# Patient Record
Sex: MALE | Race: WHITE | NOT HISPANIC OR LATINO | Employment: UNEMPLOYED | URBAN - METROPOLITAN AREA
[De-identification: names, ages, dates, MRNs, and addresses within clinical notes are randomized per-mention and may not be internally consistent; named-entity substitution may affect disease eponyms.]

---

## 2023-10-09 PROBLEM — S82.872A: Status: ACTIVE | Noted: 2023-10-09

## 2023-10-09 PROBLEM — M25.572 ACUTE LEFT ANKLE PAIN: Status: ACTIVE | Noted: 2023-10-09

## 2023-10-09 PROBLEM — F17.210 CIGARETTE SMOKER: Status: ACTIVE | Noted: 2023-10-09

## 2023-10-18 PROBLEM — Z98.890 STATUS POST SURGERY: Status: ACTIVE | Noted: 2023-10-18

## 2024-01-18 PROBLEM — I73.9 PERIPHERAL VASCULAR DISEASE: Status: ACTIVE | Noted: 2024-01-18

## 2024-01-18 PROBLEM — T84.9XXA: Status: ACTIVE | Noted: 2024-01-18

## 2024-01-18 PROBLEM — Z91.199 NON-COMPLIANT PATIENT: Status: ACTIVE | Noted: 2024-01-18

## 2024-04-11 ENCOUNTER — TELEPHONE (OUTPATIENT)
Dept: SMOKING CESSATION | Facility: CLINIC | Age: 59
End: 2024-04-11

## 2024-04-11 NOTE — TELEPHONE ENCOUNTER
Called patient to confirm clinic intake for smoking cessation on Monday 4/15/24. Patient answered and confirmed.

## 2024-04-15 ENCOUNTER — CLINICAL SUPPORT (OUTPATIENT)
Dept: SMOKING CESSATION | Facility: CLINIC | Age: 59
End: 2024-04-15
Payer: COMMERCIAL

## 2024-04-15 DIAGNOSIS — F17.200 NICOTINE DEPENDENCE: Primary | ICD-10-CM

## 2024-04-15 RX ORDER — IBUPROFEN 200 MG
1 TABLET ORAL DAILY
Qty: 28 PATCH | Refills: 0 | Status: SHIPPED | OUTPATIENT
Start: 2024-04-15 | End: 2024-05-01

## 2024-04-15 NOTE — Clinical Note
Met with patient in clinic to conduct Quit Attempt 1 intake appointment. Patient will be participating in weekly tobacco cessation meetings and will begin the prescribed tobacco cessation medication 21 mg patches. FTND of 7 indicates a low level of dependence to tobacco. ADRY-D of 0 is perceived as no mental distress/ depression.

## 2024-05-01 ENCOUNTER — CLINICAL SUPPORT (OUTPATIENT)
Dept: SMOKING CESSATION | Facility: CLINIC | Age: 59
End: 2024-05-01
Payer: COMMERCIAL

## 2024-05-01 ENCOUNTER — TELEPHONE (OUTPATIENT)
Dept: SMOKING CESSATION | Facility: CLINIC | Age: 59
End: 2024-05-01

## 2024-05-01 DIAGNOSIS — F17.200 NICOTINE DEPENDENCE: Primary | ICD-10-CM

## 2024-05-01 PROCEDURE — 99402 PREV MED CNSL INDIV APPRX 30: CPT | Mod: S$GLB,,, | Performed by: GENERAL PRACTICE

## 2024-05-01 NOTE — Clinical Note
Spoke with patient at length via phone regarding tobacco cessation progress. Patient remains on prescribed tobacco cessation medication regimen of 21 mg patches but he did not use them. Patient says that he smoked 20 cigs a day when he came to his intake appointment 2 weeks ago but but he has not smoked since our visit on 4/17/24.. Patient wanted to quit smoking because of his health and he know he needs to. Patient says he loves to read. Trigger is beer. Discussed learned addiction model, cues/triggers, personal reasons for quitting, medications, goals. The patient will continue with  therapy sessions and medication monitoring by CTTS. Prescribed medication management will be by physician. The patient denies any abnormal behavioral or mental changes at this time.

## 2024-05-01 NOTE — PROGRESS NOTES
Individual Follow-Up Form    5/1/2024    Quit Date: 4/18/24    Clinical Status of Patient: Outpatient    Length of Service: 30 minutes    Continuing Medication: no    Other Medications: none     Target Symptoms: Withdrawal and medication side effects. The following were rated moderate (3) to severe (4) on TCRS:  Moderate (3): none  Severe (4): none    Comments: Spoke with patient at length via phone regarding tobacco cessation progress. Patient received the prescribed tobacco cessation medication regimen of 21 mg patches but he did not use them. He says that he could quit on his own. Patient says that he smoked 20 cigs a day when he came to his intake appointment 2 weeks ago but but he has not smoked since our visit on 4/17/24.. Patient wanted to quit smoking because of his health and he know he needs to. Patient says he loves to read. Trigger is beer. Discussed learned addiction model, cues/triggers, personal reasons for quitting, medications, goals. The patient will continue with  therapy sessions and medication monitoring by CTTS. Prescribed medication management will be by physician. The patient denies any abnormal behavioral or mental changes at this time.     Diagnosis: F17.200    Next Visit: 2 weeks

## 2024-05-01 NOTE — TELEPHONE ENCOUNTER
Patient canceled phone follow up for smoking cessation call for today. I called to reschedule patient thought he had to come in. He gave an update via phone.

## 2024-05-22 ENCOUNTER — CLINICAL SUPPORT (OUTPATIENT)
Dept: SMOKING CESSATION | Facility: CLINIC | Age: 59
End: 2024-05-22

## 2024-05-22 DIAGNOSIS — F17.200 NICOTINE DEPENDENCE: Primary | ICD-10-CM

## 2024-05-22 NOTE — Clinical Note
Spoke with patient at length via phone regarding tobacco cessation progress. Patient is not on any tobacco cessation medication. Patient says that he remains quit as of 4/17/24. I commended him on staying quit. He stated that he does not have any craving and does not even think about it now. He say that his eating had picked up some but he is not concerned with it.  Patient says he has been reading even more to keep himself busy. No stress. Reviewed strategies, habitual behavior, stress, and high risk situations. Introduced stress with addition interventions, SOLVE, relaxation with interventions, nutrition, exercise, weight gain, and the importance of rewarding oneself for accomplishments toward becoming tobacco free. The patient will continue with  therapy sessions and medication monitoring by CTTS. Prescribed medication management will be by physician. The patient denies any abnormal behavioral or mental changes at this time.

## 2024-05-22 NOTE — PROGRESS NOTES
Individual Follow-Up Form    5/22/2024    Quit Date: 4/18/24    Clinical Status of Patient: Outpatient    Length of Service: 30 minutes    Continuing Medication: no    Other Medications: none     Target Symptoms: Withdrawal and medication side effects. The following were rated moderate (3) to severe (4) on TCRS:  Moderate (3): none  Severe (4): none    Comments: Spoke with patient at length via phone regarding tobacco cessation progress. Patient is not on any tobacco cessation medication. Patient says that he remains quit as of 4/17/24. I commended him on staying quit. He stated that he does not have any craving and does not even think about it now. He say that his eating had picked up some but he is not concerned with it.  Patient says he has been reading even more to keep himself busy. No stress. Reviewed strategies, habitual behavior, stress, and high risk situations. Introduced stress with addition interventions, SOLVE, relaxation with interventions, nutrition, exercise, weight gain, and the importance of rewarding oneself for accomplishments toward becoming tobacco free. The patient will continue with  therapy sessions and medication monitoring by CTTS. Prescribed medication management will be by physician. The patient denies any abnormal behavioral or mental changes at this time.     Diagnosis: F17.200    Next Visit: 2 weeks     30.2

## 2024-06-19 ENCOUNTER — CLINICAL SUPPORT (OUTPATIENT)
Dept: SMOKING CESSATION | Facility: CLINIC | Age: 59
End: 2024-06-19

## 2024-06-19 DIAGNOSIS — F17.200 NICOTINE DEPENDENCE: Primary | ICD-10-CM

## 2024-06-19 NOTE — Clinical Note
Spoke with patient at length via phone regarding tobacco cessation progress. Patient is not on any tobacco cessation medication. Patient says that he remains quit as of 4/18/24 though he has slipped and smoke a few cigarettes throughout the month. I asked if he would like to order a lower dosage of patch. He stated not at this time. Patient stated that he only smoked when he was around a friend that smokes. He does not buy them. Encouraged I'm to let his friend know not to give him a cigarette and that he has quit. Reviewed strategies, habitual behavior, stress, and high risk situations. Introduced stress with addition interventions, SOLVE. The patient will continue with therapy sessions by CTTS. The patient denies any abnormal behavioral or mental changes at this time.

## 2024-06-19 NOTE — PROGRESS NOTES
Individual Follow-Up Form    6/19/2024    Quit Date: 4/18/24    Clinical Status of Patient: Outpatient    Length of Service: 30 minutes    Continuing Medication: no    Other Medications: none     Target Symptoms: Withdrawal and medication side effects. The following were  rated moderate (3) to severe (4) on TCRS:  Moderate (3): none   Severe (4): none    Comments: Spoke with patient at length via phone regarding tobacco cessation progress. Patient is not on any tobacco cessation medication. Patient says that he remains quit as of 4/18/24 though he has slipped and smoke a few cigarettes throughout the month. I asked if he would like to order a lower dosage of patch. He stated not at this time. Patient stated that he only smoked when he was around a friend that smokes. He does not buy them. Encouraged I'm to let his friend know not to give him a cigarette and that he has quit. Reviewed strategies, habitual behavior, stress, and high risk situations. Introduced stress with addition interventions, SOLVE. The patient will continue with therapy sessions by CTTS. The patient denies any abnormal behavioral or mental changes at this time.     Diagnosis: F17.200    Next Visit: 2 weeks

## 2024-06-27 PROBLEM — Z12.11 COLON CANCER SCREENING: Status: ACTIVE | Noted: 2024-06-27

## 2024-06-27 PROBLEM — K57.30 DIVERTICULOSIS OF LARGE INTESTINE WITHOUT DIVERTICULITIS: Status: ACTIVE | Noted: 2024-06-27

## 2024-06-27 PROBLEM — Z12.11 SCREENING FOR MALIGNANT NEOPLASM OF COLON: Status: ACTIVE | Noted: 2024-06-27

## 2024-10-15 ENCOUNTER — CLINICAL SUPPORT (OUTPATIENT)
Dept: SMOKING CESSATION | Facility: CLINIC | Age: 59
End: 2024-10-15
Payer: COMMERCIAL

## 2024-10-15 DIAGNOSIS — F17.200 NICOTINE DEPENDENCE: Primary | ICD-10-CM

## 2024-10-15 PROCEDURE — 99406 BEHAV CHNG SMOKING 3-10 MIN: CPT | Mod: ,,, | Performed by: FAMILY MEDICINE

## 2024-10-15 NOTE — PROGRESS NOTES
Spoke with patient today in regard to smoking cessation progress for 6 month telephone follow up, he states not tobacco free. Patient states he is busy and unable to discuss the program at this time.  Patient states he will contact me at a later time.  Informed patient of benefit period and contact information if any further help or support is needed.  Will complete smart form for 3/6 month follow up on Quit attempt #1.